# Patient Record
Sex: MALE | Race: WHITE | NOT HISPANIC OR LATINO | Employment: FULL TIME | URBAN - METROPOLITAN AREA
[De-identification: names, ages, dates, MRNs, and addresses within clinical notes are randomized per-mention and may not be internally consistent; named-entity substitution may affect disease eponyms.]

---

## 2018-01-12 ENCOUNTER — OFFICE VISIT (OUTPATIENT)
Dept: INTERNAL MEDICINE | Facility: CLINIC | Age: 33
End: 2018-01-12

## 2018-01-12 VITALS
SYSTOLIC BLOOD PRESSURE: 110 MMHG | OXYGEN SATURATION: 97 % | WEIGHT: 178.6 LBS | HEART RATE: 80 BPM | TEMPERATURE: 98.2 F | BODY MASS INDEX: 24.56 KG/M2 | DIASTOLIC BLOOD PRESSURE: 68 MMHG

## 2018-01-12 DIAGNOSIS — N52.9 ERECTILE DYSFUNCTION, UNSPECIFIED ERECTILE DYSFUNCTION TYPE: ICD-10-CM

## 2018-01-12 DIAGNOSIS — F32.1 MODERATE SINGLE CURRENT EPISODE OF MAJOR DEPRESSIVE DISORDER (HCC): ICD-10-CM

## 2018-01-12 DIAGNOSIS — J01.00 SUBACUTE MAXILLARY SINUSITIS: Primary | ICD-10-CM

## 2018-01-12 LAB
ALBUMIN SERPL-MCNC: 4.6 G/DL (ref 3.2–4.8)
ALBUMIN/GLOB SERPL: 1.6 G/DL (ref 1.5–2.5)
ALP SERPL-CCNC: 70 U/L (ref 25–100)
ALT SERPL W P-5'-P-CCNC: 32 U/L (ref 7–40)
ANION GAP SERPL CALCULATED.3IONS-SCNC: 4 MMOL/L (ref 3–11)
AST SERPL-CCNC: 24 U/L (ref 0–33)
BASOPHILS # BLD AUTO: 0.04 10*3/MM3 (ref 0–0.2)
BASOPHILS NFR BLD AUTO: 0.5 % (ref 0–1)
BILIRUB SERPL-MCNC: 0.4 MG/DL (ref 0.3–1.2)
BUN BLD-MCNC: 11 MG/DL (ref 9–23)
BUN/CREAT SERPL: 11 (ref 7–25)
CALCIUM SPEC-SCNC: 9.6 MG/DL (ref 8.7–10.4)
CHLORIDE SERPL-SCNC: 106 MMOL/L (ref 99–109)
CO2 SERPL-SCNC: 28 MMOL/L (ref 20–31)
CREAT BLD-MCNC: 1 MG/DL (ref 0.6–1.3)
DEPRECATED RDW RBC AUTO: 39.6 FL (ref 37–54)
EOSINOPHIL # BLD AUTO: 0.1 10*3/MM3 (ref 0–0.3)
EOSINOPHIL NFR BLD AUTO: 1.2 % (ref 0–3)
ERYTHROCYTE [DISTWIDTH] IN BLOOD BY AUTOMATED COUNT: 12.5 % (ref 11.3–14.5)
ERYTHROCYTE [SEDIMENTATION RATE] IN BLOOD: 16 MM/HR (ref 0–15)
GFR SERPL CREATININE-BSD FRML MDRD: 87 ML/MIN/1.73
GLOBULIN UR ELPH-MCNC: 2.9 GM/DL
GLUCOSE BLD-MCNC: 96 MG/DL (ref 70–100)
HCT VFR BLD AUTO: 44.7 % (ref 38.9–50.9)
HGB BLD-MCNC: 14.8 G/DL (ref 13.1–17.5)
IMM GRANULOCYTES # BLD: 0.02 10*3/MM3 (ref 0–0.03)
IMM GRANULOCYTES NFR BLD: 0.2 % (ref 0–0.6)
LYMPHOCYTES # BLD AUTO: 2.45 10*3/MM3 (ref 0.6–4.8)
LYMPHOCYTES NFR BLD AUTO: 30.5 % (ref 24–44)
MCH RBC QN AUTO: 29.5 PG (ref 27–31)
MCHC RBC AUTO-ENTMCNC: 33.1 G/DL (ref 32–36)
MCV RBC AUTO: 89 FL (ref 80–99)
MONOCYTES # BLD AUTO: 0.58 10*3/MM3 (ref 0–1)
MONOCYTES NFR BLD AUTO: 7.2 % (ref 0–12)
NEUTROPHILS # BLD AUTO: 4.83 10*3/MM3 (ref 1.5–8.3)
NEUTROPHILS NFR BLD AUTO: 60.4 % (ref 41–71)
PLATELET # BLD AUTO: 309 10*3/MM3 (ref 150–450)
PMV BLD AUTO: 11.1 FL (ref 6–12)
POTASSIUM BLD-SCNC: 4.4 MMOL/L (ref 3.5–5.5)
PROT SERPL-MCNC: 7.5 G/DL (ref 5.7–8.2)
RBC # BLD AUTO: 5.02 10*6/MM3 (ref 4.2–5.76)
SODIUM BLD-SCNC: 138 MMOL/L (ref 132–146)
TESTOST SERPL-MCNC: 567.07 NG/DL (ref 123.06–813.86)
WBC NRBC COR # BLD: 8.02 10*3/MM3 (ref 3.5–10.8)

## 2018-01-12 PROCEDURE — 85652 RBC SED RATE AUTOMATED: CPT | Performed by: FAMILY MEDICINE

## 2018-01-12 PROCEDURE — 85025 COMPLETE CBC W/AUTO DIFF WBC: CPT | Performed by: FAMILY MEDICINE

## 2018-01-12 PROCEDURE — 80053 COMPREHEN METABOLIC PANEL: CPT | Performed by: FAMILY MEDICINE

## 2018-01-12 PROCEDURE — 84403 ASSAY OF TOTAL TESTOSTERONE: CPT | Performed by: FAMILY MEDICINE

## 2018-01-12 PROCEDURE — 99214 OFFICE O/P EST MOD 30 MIN: CPT | Performed by: FAMILY MEDICINE

## 2018-01-12 RX ORDER — CEPHALEXIN 500 MG/1
500 CAPSULE ORAL 3 TIMES DAILY
Qty: 30 CAPSULE | Refills: 0 | Status: SHIPPED | OUTPATIENT
Start: 2018-01-12 | End: 2018-01-26

## 2018-01-12 RX ORDER — BUPROPION HYDROCHLORIDE 150 MG/1
150 TABLET ORAL DAILY
Qty: 30 TABLET | Refills: 0 | Status: SHIPPED | OUTPATIENT
Start: 2018-01-12 | End: 2018-01-26 | Stop reason: SDUPTHER

## 2018-01-12 RX ORDER — SILDENAFIL 50 MG/1
50 TABLET, FILM COATED ORAL DAILY PRN
Qty: 9 TABLET | Refills: 0 | Status: SHIPPED | OUTPATIENT
Start: 2018-01-12

## 2018-01-12 NOTE — PATIENT INSTRUCTIONS
Major Depressive Disorder  Major depressive disorder is a mood disorder. It is not the same as feeling sad when bad things happen. This disorder can last for days or weeks. This disorder can make it hard to work or do things with family or friends. It can make a person feel:  · Sad.  · Empty.  · Hopeless.  · Helpless.  · Irritable.  In addition to these feelings, people who have this disorder have at least 4 of these symptoms:  · Having trouble sleeping.  · Sleeping too much.  · A big (major) change in appetite.  · A big change in weight.  · A lack of energy.  · Feelings of guilt or worthlessness.  · Having a hard time with concentrating, remembering, or making decisions.  · Slow movements.  · Restlessness.  · Thinking or dreaming about suicide or about harming oneself.  · Suicide attempt.  HOME CARE  · Take over-the-counter and prescription medicines only as told by your doctor.  · Keep all follow-up visits as told by your doctor. This includes any therapy that your doctor recommends for you.  · Lessen your stress. Do something that you enjoy, such as biking, walking, or reading a book.  · Exercise often.  · Eat a healthy diet.  · Do not drink alcohol.  · Do not take drugs.  · Get support from your family and friends.  GET HELP RIGHT AWAY IF:   · You start to hear voices.  · You see things that are not really there.  · You feel like people are out to get you (paranoid).  · You have serious thoughts about hurting yourself or others.  · You think about suicide.     This information is not intended to replace advice given to you by your health care provider. Make sure you discuss any questions you have with your health care provider.     Document Released: 11/28/2016 Document Reviewed: 11/28/2016  [employer] Patient Education ©2017 [employer]    Discussed with pt the risk of suicide, with depression or anxiety. Discussed importance of calling or going to ER if feeling suicidal. Pt currently denies  suicidal ideation and agreed with plan is he becomes suicidal. Pt states that he would not commit suicide.

## 2018-01-12 NOTE — PROGRESS NOTES
Subjective   Steven REYES is a 32 y.o. male.     Chief Complaint   Patient presents with   • Sinusitis     2 weeks of congestion, productive coughing up lime-green sputum   • Fever     bad the first week of Jan   • Depression     since grandmother passed last year       Visit Vitals   • /68   • Pulse 80   • Temp 98.2 °F (36.8 °C)   • Wt 81 kg (178 lb 9.6 oz)   • SpO2 97%   • BMI 24.56 kg/m2       Sinusitis   This is a new problem. The current episode started 1 to 4 weeks ago. The problem is unchanged. Maximum temperature: resolved. His pain is at a severity of 4/10. The pain is moderate. Associated symptoms include congestion, coughing, headaches, a hoarse voice and sinus pressure. Pertinent negatives include no chills, diaphoresis, ear pain, neck pain, shortness of breath, sneezing, sore throat or swollen glands.   Depression   Visit Type: initial  Onset of symptoms: more than 6 months ago  Progression since onset: unchanged (was worse in November and December. )  Patient presents with the following symptoms: decreased concentration, depressed mood, excessive worry, fatigue, feelings of hopelessness, feelings of worthlessness, impotence, irritability, nervousness/anxiety, weight gain and weight loss.  Patient is not experiencing: anhedonia, chest pain, choking sensation, compulsions, confusion, dizziness, dry mouth, hypersomnia, hyperventilation, insomnia, malaise, memory impairment, muscle tension, nausea, obsessions, palpitations, panic, psychomotor agitation, psychomotor retardation, restlessness, shortness of breath, suicidal ideas, suicidal planning and thoughts of death.  Frequency of symptoms: constantly   Severity: moderate   Aggravated by: family issues  Risk factors: emotional abuse and major life event  No history of: anemia, anxiety/panic attacks, arrhythmia, asthma, bipolar disorder, CAD, CHF, chronic lung disease, depression, fibromyalgia, hyperthyroidism, suicide attempt, mental illness and  substance abuse    Erectile Dysfunction   This is a new problem. The current episode started more than 1 month ago. The problem is unchanged. The nature of his difficulty is maintaining erection and achieving erection. Non-physiologic factors contributing to erectile dysfunction are anxiety, a decreased libido and performance anxiety. Irritative symptoms do not include frequency, nocturia or urgency. Obstructive symptoms do not include dribbling, incomplete emptying, an intermittent stream, a slower stream, straining or a weak stream. Pertinent negatives include no chills, dysuria, genital pain, hematuria, hesitancy or inability to urinate. The symptoms are aggravated by stress. Past treatments include nothing. Risk factors include no AM erections.   No Sexual problems prior to grandmother's death.    Pt's grandmother  last year March from cancer-stage 4 in shoulder.   Pt cared for his grandmother. GM was terminal when cancer was diagnosed.  Pt has low sex drive recently and ED.   Pt gained weight grandmother  to 208 #and then lost weight with diet and exercise.     Patient feels guilty that he was unable to do more for his grandmother.  Her cancer was not found until one of the visits to ER where he insisted they do imaging.  At that point she was already stage IV.  She was in hospice.  Patient visited her every day and the rest of the family did not.  Patient reports that his parents and family are upset with him because he did not share his inheritance with them.  Patient states that his inheritance from grandmother's estate went to her bills and that he had to take a loan out, to pay all of the  expenses.  The following portions of the patient's history were reviewed and updated as appropriate: allergies, current medications, past family history, past medical history, past social history, past surgical history and problem list.    Past Medical History:   Diagnosis Date   • Depression    • Kidney  stones 2013   • Kidney stones 2017    3.7mm stone, unsure if passed       History reviewed. No pertinent surgical history.    No Known Allergies    Family History   Problem Relation Age of Onset   • Diabetes Maternal Grandmother    • Heart disease Maternal Grandmother    • Cancer Maternal Grandmother    • Drug abuse Mother    • Drug abuse Father        Social History     Social History   • Marital status: Single     Spouse name: N/A   • Number of children: N/A   • Years of education: N/A     Occupational History   • Not on file.     Social History Main Topics   • Smoking status: Never Smoker   • Smokeless tobacco: Never Used   • Alcohol use 0.6 - 1.2 oz/week     1 - 2 Cans of beer per week   • Drug use: No   • Sexual activity: Not Currently     Partners: Female     Other Topics Concern   • Not on file     Social History Narrative    Works at [employer]       Review of Systems   Constitutional: Positive for fatigue, fever, irritability, weight gain and weight loss. Negative for chills and diaphoresis.   HENT: Positive for congestion, hoarse voice, postnasal drip, rhinorrhea, sinus pain and sinus pressure. Negative for ear pain, nosebleeds, sneezing and sore throat.    Eyes: Negative.  Negative for redness and itching.   Respiratory: Positive for cough. Negative for choking, shortness of breath and wheezing.    Cardiovascular: Negative.  Negative for chest pain and palpitations.   Gastrointestinal: Negative.  Negative for abdominal pain, constipation, diarrhea, nausea and vomiting.   Endocrine: Negative.  Negative for cold intolerance and heat intolerance.   Genitourinary: Positive for decreased libido and impotence. Negative for dysuria, frequency, hematuria, hesitancy, incomplete emptying, nocturia and urgency.   Musculoskeletal: Negative.  Negative for arthralgias, back pain and neck pain.   Skin: Negative.  Negative for color change and rash.   Allergic/Immunologic: Negative.  Negative for environmental allergies.    Neurological: Positive for headaches. Negative for dizziness, syncope and light-headedness.   Hematological: Negative.  Negative for adenopathy. Does not bruise/bleed easily.   Psychiatric/Behavioral: Positive for decreased concentration, dysphoric mood and sleep disturbance. Negative for confusion, substance abuse and suicidal ideas. The patient is nervous/anxious. The patient does not have insomnia.        Objective   Physical Exam   Constitutional: He is oriented to person, place, and time. He appears well-developed.   HENT:   Head: Normocephalic.   Right Ear: Tympanic membrane, external ear and ear canal normal.   Left Ear: Tympanic membrane, external ear and ear canal normal.   Nose: Rhinorrhea present. Right sinus exhibits no maxillary sinus tenderness and no frontal sinus tenderness. Left sinus exhibits maxillary sinus tenderness. Left sinus exhibits no frontal sinus tenderness.   Mouth/Throat: Uvula is midline and mucous membranes are normal. Normal dentition. Posterior oropharyngeal erythema present. Tonsils are 2+ on the right. Tonsils are 2+ on the left. No tonsillar exudate.       Eyes: Conjunctivae and EOM are normal. Pupils are equal, round, and reactive to light.   Neck: Trachea normal and normal range of motion. Neck supple. No thyroid mass and no thyromegaly present.   Cardiovascular: Normal rate and regular rhythm.    No murmur heard.  Pulmonary/Chest: Effort normal and breath sounds normal. No respiratory distress. He has no decreased breath sounds. He has no wheezes. He has no rhonchi. He has no rales. He exhibits no tenderness.   Abdominal: Soft. Bowel sounds are normal. There is no tenderness.   Musculoskeletal: Normal range of motion.   Neurological: He is alert and oriented to person, place, and time.   Skin: Skin is warm and dry.   Psychiatric: He has a normal mood and affect. His behavior is normal.   Nursing note and vitals reviewed.      Assessment/Plan   Steven was seen today for  sinusitis, fever and depression.    Diagnoses and all orders for this visit:    Subacute maxillary sinusitis  -     CBC & Differential  -     CBC Auto Differential    Erectile dysfunction, unspecified erectile dysfunction type  -     Testosterone  -     sildenafil (VIAGRA) 50 MG tablet; Take 1 tablet by mouth Daily As Needed for erectile dysfunction.    Moderate single current episode of major depressive disorder  -     Sedimentation Rate  -     Comprehensive Metabolic Panel  -     CBC & Differential  -     Ambulatory Referral to Behavioral Health  -     CBC Auto Differential    Other orders  -     buPROPion XL (WELLBUTRIN XL) 150 MG 24 hr tablet; Take 1 tablet by mouth Daily.  -     cephalexin (KEFLEX) 500 MG capsule; Take 1 capsule by mouth 3 (Three) Times a Day.        Discussed with pt the risk of suicide, with depression or anxiety. Discussed importance of calling or going to ER if feeling suicidal. Pt currently denies suicidal ideation and agreed with plan is he becomes suicidal. Pt states that he would not commit suicide.     Counseling   Handout on ED         Current Outpatient Prescriptions:   •  buPROPion XL (WELLBUTRIN XL) 150 MG 24 hr tablet, Take 1 tablet by mouth Daily., Disp: 30 tablet, Rfl: 0  •  cephalexin (KEFLEX) 500 MG capsule, Take 1 capsule by mouth 3 (Three) Times a Day., Disp: 30 capsule, Rfl: 0  •  CREATINE PO, Take  by mouth., Disp: , Rfl:   •  ibuprofen (ADVIL,MOTRIN) 800 MG tablet, , Disp: , Rfl:   •  sildenafil (VIAGRA) 50 MG tablet, Take 1 tablet by mouth Daily As Needed for erectile dysfunction., Disp: 9 tablet, Rfl: 0    Return in about 2 weeks (around 1/26/2018), or if symptoms worsen or fail to improve, for Recheck.

## 2018-01-26 ENCOUNTER — OFFICE VISIT (OUTPATIENT)
Dept: INTERNAL MEDICINE | Facility: CLINIC | Age: 33
End: 2018-01-26

## 2018-01-26 VITALS
SYSTOLIC BLOOD PRESSURE: 102 MMHG | TEMPERATURE: 97.8 F | DIASTOLIC BLOOD PRESSURE: 64 MMHG | BODY MASS INDEX: 24.12 KG/M2 | WEIGHT: 175.4 LBS | OXYGEN SATURATION: 99 % | HEART RATE: 51 BPM

## 2018-01-26 DIAGNOSIS — F32.1 MODERATE SINGLE CURRENT EPISODE OF MAJOR DEPRESSIVE DISORDER (HCC): ICD-10-CM

## 2018-01-26 PROCEDURE — 99213 OFFICE O/P EST LOW 20 MIN: CPT | Performed by: FAMILY MEDICINE

## 2018-01-26 RX ORDER — BUPROPION HYDROCHLORIDE 150 MG/1
150 TABLET ORAL DAILY
Qty: 30 TABLET | Refills: 3 | Status: SHIPPED | OUTPATIENT
Start: 2018-01-26 | End: 2018-04-26 | Stop reason: SDUPTHER

## 2018-01-26 NOTE — PROGRESS NOTES
Subjective   Steven REYES is a 32 y.o. male.     Chief Complaint   Patient presents with   • Sinusitis     finished antibiotics   • Sore Throat     a few days worth; worse in mornings       Visit Vitals   • /64   • Pulse 51   • Temp 97.8 °F (36.6 °C)   • Wt 79.6 kg (175 lb 6.4 oz)   • SpO2 99%   • BMI 24.12 kg/m2       Depression   Visit Type: follow-up (ED has resolved without viagra)  Patient presents with the following symptoms: weight loss.  Patient is not experiencing: anhedonia, chest pain, choking sensation, compulsions, confusion, decreased concentration, depressed mood, dizziness, dry mouth, excessive worry, fatigue, feelings of hopelessness, feelings of worthlessness, hypersomnia, hyperventilation, impotence, insomnia, irritability, malaise, memory impairment, muscle tension, nausea, nervousness/anxiety, obsessions, palpitations, panic, psychomotor agitation, psychomotor retardation, restlessness, shortness of breath, suicidal ideas, suicidal planning, thoughts of death and weight gain.  Frequency of symptoms: rarely   Severity: mild   Sleep quality: good  Nighttime awakenings: none  Compliance with medications:  %           Sinusitis has resolved, throat is much better just drinking water.     The following portions of the patient's history were reviewed and updated as appropriate: allergies, current medications, past family history, past medical history, past social history, past surgical history and problem list.    Past Medical History:   Diagnosis Date   • Depression    • Kidney stones 2013   • Kidney stones 2017    3.7mm stone, unsure if passed     No past surgical history on file.    No Known Allergies    Family History   Problem Relation Age of Onset   • Diabetes Maternal Grandmother    • Heart disease Maternal Grandmother    • Cancer Maternal Grandmother    • Drug abuse Mother    • Drug abuse Father        Social History     Social History   • Marital status: Single     Spouse  name: N/A   • Number of children: N/A   • Years of education: N/A     Occupational History   • Not on file.     Social History Main Topics   • Smoking status: Never Smoker   • Smokeless tobacco: Never Used   • Alcohol use 0.6 - 1.2 oz/week     1 - 2 Cans of beer per week   • Drug use: No   • Sexual activity: Not Currently     Partners: Female     Other Topics Concern   • Not on file     Social History Narrative    Works at [employer]       Review of Systems   Constitutional: Positive for weight loss. Negative for chills, diaphoresis, fatigue, fever, irritability and weight gain.   HENT: Positive for sore throat. Negative for ear pain, nosebleeds, postnasal drip, rhinorrhea, sinus pressure and sneezing.         Minimal sore throat that resolves with drinking water.   Eyes: Negative.  Negative for redness and itching.   Respiratory: Negative.  Negative for cough, choking, shortness of breath and wheezing.    Cardiovascular: Negative.  Negative for chest pain and palpitations.   Gastrointestinal: Negative.  Negative for abdominal pain, constipation, diarrhea, nausea and vomiting.   Endocrine: Negative.  Negative for cold intolerance and heat intolerance.   Genitourinary: Negative.  Negative for dysuria, frequency, hematuria, impotence and urgency.        Has not needed viagra.   Musculoskeletal: Positive for back pain. Negative for arthralgias and neck pain.        Back pain only if in a strange position at work.   Skin: Negative.  Negative for color change and rash.   Allergic/Immunologic: Negative.  Negative for environmental allergies.   Neurological: Negative.  Negative for dizziness, syncope, light-headedness and headaches.   Hematological: Negative.  Negative for adenopathy. Does not bruise/bleed easily.   Psychiatric/Behavioral: Negative.  Negative for confusion, decreased concentration, dysphoric mood and suicidal ideas. The patient is not nervous/anxious and does not have insomnia.         Depression stable with  medication.       Objective   Physical Exam   Constitutional: He is oriented to person, place, and time. He appears well-developed.   HENT:   Head: Normocephalic.   Right Ear: Tympanic membrane, external ear and ear canal normal.   Left Ear: Tympanic membrane, external ear and ear canal normal.   Nose: Nose normal.   Mouth/Throat: Uvula is midline, oropharynx is clear and moist and mucous membranes are normal. No oropharyngeal exudate, posterior oropharyngeal edema or posterior oropharyngeal erythema.   Eyes: Conjunctivae and EOM are normal. Pupils are equal, round, and reactive to light.   Neck: Normal range of motion. Neck supple.   Cardiovascular: Normal rate and regular rhythm.    No murmur heard.  Pulmonary/Chest: Effort normal and breath sounds normal. No respiratory distress. He has no decreased breath sounds. He has no wheezes. He has no rhonchi. He has no rales. He exhibits no tenderness.   Abdominal: Soft. Bowel sounds are normal. There is no tenderness.   Musculoskeletal: Normal range of motion.   Neurological: He is alert and oriented to person, place, and time.   Skin: Skin is warm and dry.   Psychiatric: He has a normal mood and affect. His behavior is normal.   Nursing note and vitals reviewed.      Assessment/Plan   Steven was seen today for sinusitis and sore throat.    Diagnoses and all orders for this visit:    Moderate single current episode of major depressive disorder  -     buPROPion XL (WELLBUTRIN XL) 150 MG 24 hr tablet; Take 1 tablet by mouth Daily.    depression is markedly better.  URI has resolved               Current Outpatient Prescriptions:   •  buPROPion XL (WELLBUTRIN XL) 150 MG 24 hr tablet, Take 1 tablet by mouth Daily., Disp: 30 tablet, Rfl: 3  •  CREATINE PO, Take  by mouth., Disp: , Rfl:   •  ibuprofen (ADVIL,MOTRIN) 800 MG tablet, , Disp: , Rfl:   •  sildenafil (VIAGRA) 50 MG tablet, Take 1 tablet by mouth Daily As Needed for erectile dysfunction., Disp: 9 tablet, Rfl:  0    Return in about 3 months (around 4/26/2018), or if symptoms worsen or fail to improve, for Recheck.

## 2018-02-01 ENCOUNTER — TELEPHONE (OUTPATIENT)
Dept: INTERNAL MEDICINE | Facility: CLINIC | Age: 33
End: 2018-02-01

## 2018-02-01 RX ORDER — OSELTAMIVIR PHOSPHATE 75 MG/1
75 CAPSULE ORAL 2 TIMES DAILY
Qty: 10 CAPSULE | Refills: 0 | Status: SHIPPED | OUTPATIENT
Start: 2018-02-01 | End: 2018-04-26

## 2018-02-01 NOTE — TELEPHONE ENCOUNTER
Patient's girlfriend and kids all have type A flu. He's been around them and helping out. Okay to send to [facility]dy on file

## 2018-04-26 ENCOUNTER — OFFICE VISIT (OUTPATIENT)
Dept: INTERNAL MEDICINE | Facility: CLINIC | Age: 33
End: 2018-04-26

## 2018-04-26 VITALS
HEIGHT: 72 IN | WEIGHT: 172.8 LBS | OXYGEN SATURATION: 99 % | SYSTOLIC BLOOD PRESSURE: 118 MMHG | HEART RATE: 71 BPM | BODY MASS INDEX: 23.4 KG/M2 | TEMPERATURE: 97.5 F | DIASTOLIC BLOOD PRESSURE: 62 MMHG

## 2018-04-26 DIAGNOSIS — F32.1 MODERATE SINGLE CURRENT EPISODE OF MAJOR DEPRESSIVE DISORDER (HCC): ICD-10-CM

## 2018-04-26 PROCEDURE — 99213 OFFICE O/P EST LOW 20 MIN: CPT | Performed by: FAMILY MEDICINE

## 2018-04-26 RX ORDER — BUPROPION HYDROCHLORIDE 150 MG/1
150 TABLET ORAL DAILY
Qty: 30 TABLET | Refills: 3 | Status: SHIPPED | OUTPATIENT
Start: 2018-04-26

## 2018-04-26 NOTE — PROGRESS NOTES
"Subjective   Steven REYES is a 33 y.o. male.     Chief Complaint   Patient presents with   • Depression     3 month follow up       Visit Vitals  /62   Pulse 71   Temp 97.5 °F (36.4 °C)   Ht 181.6 cm (71.5\")   Wt 78.4 kg (172 lb 12.8 oz)   SpO2 99%   BMI 23.76 kg/m²       Depression   Visit Type: follow-up  Patient presents with the following symptoms: nervousness/anxiety (mild).  Patient is not experiencing: anhedonia, chest pain, choking sensation, compulsions, confusion, decreased concentration, depressed mood, dizziness, dry mouth, excessive worry, fatigue, feelings of hopelessness, feelings of worthlessness, hypersomnia, hyperventilation, impotence, insomnia, irritability, malaise, memory impairment, muscle tension, nausea, obsessions, palpitations, panic, psychomotor agitation, psychomotor retardation, restlessness, shortness of breath, suicidal ideas, suicidal planning, thoughts of death, weight gain and weight loss.  Frequency of symptoms: rarely   Severity: mild   Sleep per night: 8 hours  Sleep quality: good  Nighttime awakenings: none  Compliance with medications:  %             The following portions of the patient's history were reviewed and updated as appropriate: allergies, current medications, past family history, past medical history, past social history, past surgical history and problem list.    Past Medical History:   Diagnosis Date   • Depression    • Kidney stones 2013   • Kidney stones 2017    3.7mm stone, unsure if passed     No past surgical history on file.  No Known Allergies  Family History   Problem Relation Age of Onset   • Diabetes Maternal Grandmother    • Heart disease Maternal Grandmother    • Cancer Maternal Grandmother    • Drug abuse Mother    • Drug abuse Father      Social History     Social History   • Marital status: Single     Spouse name: N/A   • Number of children: N/A   • Years of education: N/A     Occupational History   • Not on file.     Social History " Main Topics   • Smoking status: Never Smoker   • Smokeless tobacco: Never Used   • Alcohol use 0.6 - 1.2 oz/week     1 - 2 Cans of beer per week   • Drug use: No   • Sexual activity: Not Currently     Partners: Female     Other Topics Concern   • Not on file     Social History Narrative    Works at [employer]       Review of Systems   Constitutional: Negative.  Negative for chills, diaphoresis, fatigue, fever, irritability, weight gain and weight loss.   HENT: Negative.  Negative for ear pain, nosebleeds, postnasal drip, rhinorrhea, sinus pressure, sneezing and sore throat.    Eyes: Negative.  Negative for redness and itching.   Respiratory: Negative.  Negative for cough, choking, shortness of breath and wheezing.    Cardiovascular: Negative.  Negative for chest pain and palpitations.   Gastrointestinal: Negative.  Negative for abdominal pain, constipation, diarrhea, nausea and vomiting.   Endocrine: Negative.  Negative for cold intolerance and heat intolerance.   Genitourinary: Negative.  Negative for dysuria, frequency, hematuria, impotence and urgency.   Musculoskeletal: Positive for back pain (pt statnds in one spot at work). Negative for arthralgias and neck pain.   Skin: Negative.  Negative for color change and rash.   Allergic/Immunologic: Negative.  Negative for environmental allergies.   Neurological: Negative.  Negative for dizziness, syncope, light-headedness and headaches.   Hematological: Negative.  Negative for adenopathy. Does not bruise/bleed easily.   Psychiatric/Behavioral: Negative for confusion, decreased concentration, dysphoric mood and suicidal ideas. The patient is nervous/anxious (mild). The patient does not have insomnia.         Depression stable on medication.        Objective   Physical Exam   Constitutional: He is oriented to person, place, and time. He appears well-developed.   HENT:   Head: Normocephalic.   Right Ear: External ear normal.   Left Ear: External ear normal.   Nose: Nose  normal.   Eyes: Conjunctivae and EOM are normal. Pupils are equal, round, and reactive to light.   Neck: Normal range of motion. Neck supple.   Cardiovascular: Normal rate and regular rhythm.    No murmur heard.  Pulmonary/Chest: Effort normal and breath sounds normal. No respiratory distress. He has no decreased breath sounds. He has no wheezes. He has no rhonchi. He has no rales. He exhibits no tenderness.   Abdominal: Soft. Bowel sounds are normal. There is no tenderness.   Musculoskeletal: Normal range of motion.   Neurological: He is alert and oriented to person, place, and time.   Skin: Skin is warm and dry.   Psychiatric: He has a normal mood and affect. His behavior is normal.   Nursing note and vitals reviewed.      Assessment/Plan   Steven was seen today for depression.    Diagnoses and all orders for this visit:    Moderate single current episode of major depressive disorder  -     buPROPion XL (WELLBUTRIN XL) 150 MG 24 hr tablet; Take 1 tablet by mouth Daily.                   Current Outpatient Prescriptions:   •  buPROPion XL (WELLBUTRIN XL) 150 MG 24 hr tablet, Take 1 tablet by mouth Daily., Disp: 30 tablet, Rfl: 3  •  ibuprofen (ADVIL,MOTRIN) 800 MG tablet, , Disp: , Rfl:   •  sildenafil (VIAGRA) 50 MG tablet, Take 1 tablet by mouth Daily As Needed for erectile dysfunction., Disp: 9 tablet, Rfl: 0    Return in about 4 months (around 8/26/2018), or if symptoms worsen or fail to improve, for Recheck, Annual physical.

## 2018-05-11 ENCOUNTER — OFFICE VISIT (OUTPATIENT)
Dept: INTERNAL MEDICINE | Facility: CLINIC | Age: 33
End: 2018-05-11

## 2018-05-11 VITALS
SYSTOLIC BLOOD PRESSURE: 112 MMHG | TEMPERATURE: 97.9 F | HEART RATE: 68 BPM | OXYGEN SATURATION: 99 % | WEIGHT: 171.4 LBS | HEIGHT: 72 IN | BODY MASS INDEX: 23.22 KG/M2 | RESPIRATION RATE: 16 BRPM | DIASTOLIC BLOOD PRESSURE: 66 MMHG

## 2018-05-11 DIAGNOSIS — J06.9 ACUTE URI: Primary | ICD-10-CM

## 2018-05-11 DIAGNOSIS — R05.9 COUGH: ICD-10-CM

## 2018-05-11 PROCEDURE — 99214 OFFICE O/P EST MOD 30 MIN: CPT | Performed by: NURSE PRACTITIONER

## 2018-05-11 RX ORDER — BROMPHENIRAMINE MALEATE, PSEUDOEPHEDRINE HYDROCHLORIDE, AND DEXTROMETHORPHAN HYDROBROMIDE 2; 30; 10 MG/5ML; MG/5ML; MG/5ML
10 SYRUP ORAL 4 TIMES DAILY PRN
Qty: 300 ML | Refills: 0 | Status: SHIPPED | OUTPATIENT
Start: 2018-05-11

## 2018-05-11 RX ORDER — AZITHROMYCIN 250 MG/1
TABLET, FILM COATED ORAL
Qty: 6 TABLET | Refills: 0 | Status: SHIPPED | OUTPATIENT
Start: 2018-05-11

## 2018-05-11 NOTE — PROGRESS NOTES
Subjective   Steven REYES is a 33 y.o. male.     Cough   This is a new problem. The current episode started 1 to 4 weeks ago. The problem has been unchanged. The problem occurs every few minutes. The cough is productive of purulent sputum. Associated symptoms include ear congestion, nasal congestion, rhinorrhea and shortness of breath. Pertinent negatives include no chest pain, chills, ear pain, fever, headaches, heartburn, hemoptysis, myalgias, postnasal drip, rash, sore throat, sweats, weight loss or wheezing. The symptoms are aggravated by exercise. Treatments tried: daquil. The treatment provided no relief. There is no history of asthma, bronchiectasis, bronchitis, COPD, emphysema, environmental allergies or pneumonia.   Shortness of Breath   This is a new problem. The current episode started 1 to 4 weeks ago. The problem occurs intermittently. The problem has been waxing and waning. Associated symptoms include coryza, rhinorrhea and sputum production. Pertinent negatives include no abdominal pain, chest pain, claudication, ear pain, fever, headaches, hemoptysis, leg pain, leg swelling, neck pain, orthopnea, PND, rash, sore throat, swollen glands, syncope, vomiting or wheezing. The symptoms are aggravated by exercise. The patient has no known risk factors for DVT/PE. Treatments tried: dayquil. The treatment provided no relief. There is no history of allergies, aspirin allergies, asthma, bronchiolitis, CAD, chronic lung disease, COPD, DVT, a heart failure, PE, pneumonia or a recent surgery.   URI    This is a new problem. The current episode started 1 to 4 weeks ago. The problem has been unchanged. There has been no fever. Associated symptoms include congestion, coughing, a plugged ear sensation and rhinorrhea. Pertinent negatives include no abdominal pain, chest pain, diarrhea, dysuria, ear pain, headaches, joint pain, joint swelling, nausea, neck pain, rash, sinus pain, sneezing, sore throat, swollen  glands, vomiting or wheezing. Treatments tried: dayquil. The treatment provided no relief.          The following portions of the patient's history were reviewed and updated as appropriate: allergies, current medications, past family history, past medical history, past social history, past surgical history and problem list.    Review of Systems   Constitutional: Positive for fatigue. Negative for appetite change, chills, diaphoresis, fever and weight loss.   HENT: Positive for congestion and rhinorrhea. Negative for ear pain, postnasal drip, sinus pain, sinus pressure, sneezing, sore throat and voice change.    Respiratory: Positive for cough, sputum production and shortness of breath. Negative for hemoptysis and wheezing.    Cardiovascular: Negative for chest pain, orthopnea, claudication, leg swelling, syncope and PND.   Gastrointestinal: Negative for abdominal pain, diarrhea, heartburn, nausea and vomiting.   Genitourinary: Negative for dysuria.   Musculoskeletal: Negative for joint pain, myalgias and neck pain.   Skin: Negative for rash.   Allergic/Immunologic: Negative for environmental allergies.   Neurological: Negative for dizziness, weakness and headaches.       Objective   Physical Exam   Constitutional: He is oriented to person, place, and time. He appears well-developed and well-nourished. No distress.   HENT:   Head: Normocephalic and atraumatic.   Right Ear: External ear normal.   Left Ear: External ear normal.   Nose: Nose normal.   Mouth/Throat: Oropharynx is clear and moist. No oropharyngeal exudate.   Eyes: Conjunctivae are normal. Right eye exhibits no discharge. Left eye exhibits no discharge.   Neck: Normal range of motion. Neck supple.   Cardiovascular: Normal rate, regular rhythm and normal heart sounds.    Pulmonary/Chest: Effort normal and breath sounds normal. No respiratory distress.   Abdominal: Soft. He exhibits no distension.   Lymphadenopathy:     He has no cervical adenopathy.    Neurological: He is alert and oriented to person, place, and time.   Skin: Skin is warm and dry. Capillary refill takes less than 2 seconds. He is not diaphoretic.   Nursing note and vitals reviewed.      Assessment/Plan      Steven was seen today for cough and shortness of breath.    Diagnoses and all orders for this visit:    Acute URI  -     azithromycin (ZITHROMAX Z-DOROTHY) 250 MG tablet; Take 2 tablets the first day, then 1 tablet daily for 4 days.  -     brompheniramine-pseudoephedrine-DM 30-2-10 MG/5ML syrup; Take 10 mL by mouth 4 (Four) Times a Day As Needed for Congestion or Cough.    Cough  -     brompheniramine-pseudoephedrine-DM 30-2-10 MG/5ML syrup; Take 10 mL by mouth 4 (Four) Times a Day As Needed for Congestion or Cough.    meds as above- AE discussed  Increase rest and fluids  Return if symptoms worsen or fail to improve over the next 24-48 hours.  Plan of care discussed with pt. They verbalized understanding and agreement.